# Patient Record
Sex: MALE | Race: WHITE | URBAN - METROPOLITAN AREA
[De-identification: names, ages, dates, MRNs, and addresses within clinical notes are randomized per-mention and may not be internally consistent; named-entity substitution may affect disease eponyms.]

---

## 2017-01-30 ENCOUNTER — TELEPHONE (OUTPATIENT)
Dept: FAMILY MEDICINE CLINIC | Facility: CLINIC | Age: 22
End: 2017-01-30

## 2017-01-30 PROBLEM — Z00.00 PHYSICAL EXAM: Status: ACTIVE | Noted: 2017-01-06

## 2017-01-30 PROBLEM — Z23 NEED FOR PROPHYLACTIC VACCINATION AND INOCULATION AGAINST VIRAL HEPATITIS: Status: ACTIVE | Noted: 2017-01-10

## 2017-01-30 PROBLEM — Z23 NEED FOR PROPHYLACTIC VACCINATION AND INOCULATION AGAINST SINGLE DISEASE: Status: ACTIVE | Noted: 2017-01-10

## 2017-01-30 RX ORDER — FLUTICASONE PROPIONATE 50 MCG
2 SPRAY, SUSPENSION (ML) NASAL AS NEEDED
COMMUNITY
Start: 2017-01-06 | End: 2018-04-04

## 2017-01-30 NOTE — TELEPHONE ENCOUNTER
Since his first [address] inj was in January, he is really due for it in March. Order entered. He will also be due for Gardasil #3 and hepatitis a #2 in 6 months, which would be July 2017.

## 2017-03-13 ENCOUNTER — NURSE ONLY (OUTPATIENT)
Dept: FAMILY MEDICINE CLINIC | Facility: CLINIC | Age: 22
End: 2017-03-13

## 2017-03-13 PROCEDURE — 90651 9VHPV VACCINE 2/3 DOSE IM: CPT | Performed by: NURSE PRACTITIONER

## 2017-03-13 PROCEDURE — 90471 IMMUNIZATION ADMIN: CPT | Performed by: NURSE PRACTITIONER

## 2017-03-15 ENCOUNTER — OFFICE VISIT (OUTPATIENT)
Dept: FAMILY MEDICINE CLINIC | Facility: CLINIC | Age: 22
End: 2017-03-15

## 2017-03-15 VITALS
DIASTOLIC BLOOD PRESSURE: 74 MMHG | HEART RATE: 84 BPM | SYSTOLIC BLOOD PRESSURE: 124 MMHG | TEMPERATURE: 98 F | OXYGEN SATURATION: 100 % | WEIGHT: 205.63 LBS | RESPIRATION RATE: 20 BRPM

## 2017-03-15 DIAGNOSIS — J40 BRONCHITIS: Primary | ICD-10-CM

## 2017-03-15 DIAGNOSIS — R09.82 POSTNASAL DRIP: ICD-10-CM

## 2017-03-15 PROBLEM — Z23 NEED FOR PROPHYLACTIC VACCINATION AND INOCULATION AGAINST SINGLE DISEASE: Status: RESOLVED | Noted: 2017-01-10 | Resolved: 2017-03-15

## 2017-03-15 PROBLEM — Z23 NEED FOR PROPHYLACTIC VACCINATION AND INOCULATION AGAINST VIRAL HEPATITIS: Status: RESOLVED | Noted: 2017-01-10 | Resolved: 2017-03-15

## 2017-03-15 PROCEDURE — 99214 OFFICE O/P EST MOD 30 MIN: CPT | Performed by: FAMILY MEDICINE

## 2017-03-15 RX ORDER — AZITHROMYCIN 250 MG/1
TABLET, FILM COATED ORAL
Qty: 6 TABLET | Refills: 0 | Status: SHIPPED | OUTPATIENT
Start: 2017-03-15 | End: 2017-04-04 | Stop reason: ALTCHOICE

## 2017-03-15 NOTE — PATIENT INSTRUCTIONS
Advice rest, plenty of fluids. Start antibiotics, continue with use nasal inhaler. May use mucinex or robitussin as needed   Return to clinic in 1-2 weeks if no improvement. Sooner if symptoms gets worse.

## 2017-03-16 NOTE — PROGRESS NOTES
[address]  PROGRESS NOTE  Chief Complaint:   Patient presents with:  URI: nasal congestion, cough, sputum production, past 2-3 weeks.        HPI:   This is a 24year old male presents complaining of cough, nasal congestion for past 2-3 w stiffness. LYMPHATICS:  Denies enlarged nodes  ENDOCRINOLOGIC:  Denies excessive sweating, cold or heat intolerance, polyuria or polydipsia. ALLERGIES:  Denies allergic response, history of asthma, sneezing, hives, eczema or rhinitis.      EXAM:   / or robitussin as needed   Return to clinic in 1-2 weeks if no improvement. Sooner if symptoms gets worse.         Health Maintenance:  Annual Depression Screen due on 09/18/1995  Hepatitis B Vaccines(1 of 3 - Primary Series) due on 09/18/1995  Hepatitis A V

## 2017-04-03 ENCOUNTER — TELEPHONE (OUTPATIENT)
Dept: FAMILY MEDICINE CLINIC | Facility: CLINIC | Age: 22
End: 2017-04-03

## 2017-04-03 NOTE — TELEPHONE ENCOUNTER
Called patient regarding symptoms. Patient states he had sudden left side pain, patient was getting out of bed and states he must have moved the wrong way and he felt chest pain, then went away right away.  Has happened before, pain is not severe but annoyi

## 2017-04-04 ENCOUNTER — OFFICE VISIT (OUTPATIENT)
Dept: FAMILY MEDICINE CLINIC | Facility: CLINIC | Age: 22
End: 2017-04-04

## 2017-04-04 VITALS
SYSTOLIC BLOOD PRESSURE: 146 MMHG | HEIGHT: 73 IN | RESPIRATION RATE: 16 BRPM | HEART RATE: 90 BPM | WEIGHT: 203.25 LBS | TEMPERATURE: 100 F | BODY MASS INDEX: 26.94 KG/M2 | DIASTOLIC BLOOD PRESSURE: 72 MMHG

## 2017-04-04 DIAGNOSIS — F41.9 ANXIETY: ICD-10-CM

## 2017-04-04 DIAGNOSIS — R07.89 CHEST WALL DISCOMFORT: Primary | ICD-10-CM

## 2017-04-04 PROCEDURE — 99213 OFFICE O/P EST LOW 20 MIN: CPT | Performed by: FAMILY MEDICINE

## 2017-04-04 NOTE — PROGRESS NOTES
[facility] [place]  PROGRESS NOTE        HPI:   This is a 24year old male coming in for Patient presents with:  Chest Pain: Chest pain on left side when taking a deep breath, after that his upper arms felt like they were flexed when relaxed.  No Review of Systems   Constitutional: Negative. Respiratory: Negative. Cardiovascular: Positive for chest pain. Gastrointestinal: Negative. Skin: Negative. Neurological: Negative.         EXAM:   /72 mmHg  Pulse 90  Temp(Src) 100 °F (37.8 Outcome: Parent verbalizes understanding. Parent is notified to call with any questions, complications, allergies, or worsening or changing symptoms. Parent is to call with any side effects or complications from the treatments as a result of today.

## 2017-08-02 ENCOUNTER — PATIENT MESSAGE (OUTPATIENT)
Dept: FAMILY MEDICINE CLINIC | Facility: CLINIC | Age: 22
End: 2017-08-02

## 2017-08-03 NOTE — TELEPHONE ENCOUNTER
Immunizations are up to date including Hep B, not all records have transferred over to the new system. You should be fully protected and would not need to be retested other than for peace of mind.  You are not due for any immunizations till age 22 for tetn

## 2017-08-05 ENCOUNTER — TELEPHONE (OUTPATIENT)
Dept: FAMILY MEDICINE CLINIC | Facility: CLINIC | Age: 22
End: 2017-08-05

## 2017-08-05 ENCOUNTER — NURSE ONLY (OUTPATIENT)
Dept: FAMILY MEDICINE CLINIC | Facility: CLINIC | Age: 22
End: 2017-08-05

## 2017-08-05 DIAGNOSIS — Z00.00 HEALTHCARE MAINTENANCE: ICD-10-CM

## 2017-08-05 DIAGNOSIS — Z23 NEED FOR VACCINATION: Primary | ICD-10-CM

## 2017-08-05 PROCEDURE — 90651 9VHPV VACCINE 2/3 DOSE IM: CPT | Performed by: FAMILY MEDICINE

## 2017-08-05 PROCEDURE — 90471 IMMUNIZATION ADMIN: CPT | Performed by: FAMILY MEDICINE

## 2017-08-05 NOTE — TELEPHONE ENCOUNTER
Pt states he is supposed to be coming in today for his 22nd HPV- first one was 1/10/17. No orders in the system. Can you please put an order in for HPV?     Yesi Baum, 08/05/17, 8:11 AM

## 2017-08-05 NOTE — PROGRESS NOTES
Gardasil 9 0.5ml given IM in the right deltoid. Pt tolerated injection. Was sent home with out incident.     Vanessa Holley, 08/05/17, 9:48 AM

## 2017-08-10 ENCOUNTER — TELEPHONE (OUTPATIENT)
Dept: FAMILY MEDICINE CLINIC | Facility: CLINIC | Age: 22
End: 2017-08-10

## 2017-08-10 NOTE — TELEPHONE ENCOUNTER
Pt is coming in 8/11 for Nurse Visit for 2nd Hep A- can you please put the orders in?     Yesi Baum, 08/10/17, 4:59 PM

## 2017-08-11 ENCOUNTER — NURSE ONLY (OUTPATIENT)
Dept: FAMILY MEDICINE CLINIC | Facility: CLINIC | Age: 22
End: 2017-08-11

## 2017-08-11 PROCEDURE — 90471 IMMUNIZATION ADMIN: CPT | Performed by: FAMILY MEDICINE

## 2017-08-11 PROCEDURE — 90632 HEPA VACCINE ADULT IM: CPT | Performed by: FAMILY MEDICINE

## 2018-01-09 ENCOUNTER — TELEPHONE (OUTPATIENT)
Dept: FAMILY MEDICINE CLINIC | Facility: CLINIC | Age: 23
End: 2018-01-09

## 2018-01-10 ENCOUNTER — IMMUNIZATION (OUTPATIENT)
Dept: FAMILY MEDICINE CLINIC | Facility: CLINIC | Age: 23
End: 2018-01-10

## 2018-01-10 DIAGNOSIS — Z23 NEED FOR VACCINATION: ICD-10-CM

## 2018-01-10 PROCEDURE — 90686 IIV4 VACC NO PRSV 0.5 ML IM: CPT | Performed by: FAMILY MEDICINE

## 2018-01-10 PROCEDURE — 90471 IMMUNIZATION ADMIN: CPT | Performed by: FAMILY MEDICINE

## 2018-03-15 ENCOUNTER — TELEPHONE (OUTPATIENT)
Dept: FAMILY MEDICINE CLINIC | Facility: CLINIC | Age: 23
End: 2018-03-15

## 2018-04-04 ENCOUNTER — OFFICE VISIT (OUTPATIENT)
Dept: FAMILY MEDICINE CLINIC | Facility: CLINIC | Age: 23
End: 2018-04-04

## 2018-04-04 VITALS
HEIGHT: 73 IN | WEIGHT: 199.19 LBS | RESPIRATION RATE: 16 BRPM | BODY MASS INDEX: 26.4 KG/M2 | DIASTOLIC BLOOD PRESSURE: 82 MMHG | HEART RATE: 80 BPM | SYSTOLIC BLOOD PRESSURE: 132 MMHG | TEMPERATURE: 98 F

## 2018-04-04 DIAGNOSIS — L72.3 SEBACEOUS CYST: Primary | ICD-10-CM

## 2018-04-04 PROCEDURE — 99213 OFFICE O/P EST LOW 20 MIN: CPT | Performed by: FAMILY MEDICINE

## 2018-04-04 NOTE — PATIENT INSTRUCTIONS
Likely benign cyst.   Recommend to monitor. If any change in size, redness, pain or infection then recommend to return to clinic or go see ENT Dr [place] Soulier.          Epidermoid Cyst (Sebaceous Cyst), No Infection  An epidermoid cyst (sebaceous cyst) is a te may not be painful  · The cyst may or may not smell bad  · The cyst may become inflamed or red  · The cyst may leak fluid or thick material  Home care  Epidermoid cysts often go away without any treatment.  If your cyst doesn’t go away, and it bothers you,

## 2018-04-04 NOTE — PROGRESS NOTES
[facility] [place]  PROGRESS NOTE  Chief Complaint:   Patient presents with:  Lump: lump under ear by jaw, couple weeks, no pain      HPI:   This is a 25year old male presents for evaluation of 2 small lumps on left side of her neck and the lef PSYCHIATRIC:  Denies depression or anxiety.       EXAM:   /82 (BP Location: Right arm, Patient Position: Sitting, Cuff Size: large)   Pulse 80   Temp 98.4 °F (36.9 °C) (Temporal)   Resp 16   Ht 73\"   Wt 199 lb 3.2 oz   BMI 26.28 kg/m²  Estimated body An epidermoid cyst (sebaceous cyst) is a term that refers to 2 similar types of cysts: those found in the skin (epidermoid), and those found around hair follicles (pilar).   Some general facts about these cysts:  · A cyst is a sac filled with material that Epidermoid cysts often go away without any treatment. If your cyst doesn’t go away, and it bothers you, it may be drained or removed. If the cyst drains on its own, it may return. Resist the temptation to squeeze, pop, stick a needle in it, or cut it open.

## 2020-02-13 ENCOUNTER — OFFICE VISIT (OUTPATIENT)
Dept: FAMILY MEDICINE CLINIC | Facility: CLINIC | Age: 25
End: 2020-02-13
Payer: COMMERCIAL

## 2020-02-13 VITALS
SYSTOLIC BLOOD PRESSURE: 120 MMHG | OXYGEN SATURATION: 98 % | WEIGHT: 219 LBS | RESPIRATION RATE: 16 BRPM | TEMPERATURE: 99 F | BODY MASS INDEX: 29.03 KG/M2 | DIASTOLIC BLOOD PRESSURE: 80 MMHG | HEART RATE: 89 BPM | HEIGHT: 73 IN

## 2020-02-13 DIAGNOSIS — Z23 NEED FOR VACCINATION: ICD-10-CM

## 2020-02-13 DIAGNOSIS — Z00.00 PHYSICAL EXAM: Primary | ICD-10-CM

## 2020-02-13 PROCEDURE — 90471 IMMUNIZATION ADMIN: CPT | Performed by: FAMILY MEDICINE

## 2020-02-13 PROCEDURE — 99395 PREV VISIT EST AGE 18-39: CPT | Performed by: FAMILY MEDICINE

## 2020-02-13 PROCEDURE — 90715 TDAP VACCINE 7 YRS/> IM: CPT | Performed by: FAMILY MEDICINE

## 2020-02-13 NOTE — PATIENT INSTRUCTIONS
Normal exam today. 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 Form filled out and signed. Tdap given today.

## 2020-02-13 NOTE — PROGRESS NOTES
[address]    Chief Complaint:   Patient presents with:  Physical      HPI:   Cristy Monahan is a 25year old male who presents for an Annual Health Visit.    Patient presents to clinic for physical exam and clearance to participate in denies unexpected wt gain or wt loss      EXAM:   /80 (BP Location: Right arm, Patient Position: Sitting, Cuff Size: large)   Pulse 89   Temp 98.9 °F (37.2 °C) (Tympanic)   Resp 16   Ht 73\"   Wt 219 lb (99.3 kg)   SpO2 98%   BMI 28.89 kg/m²    Wt Re agrees to the plan.     Problem List:  Patient Active Problem List:     Physical exam     Cervicalgia     Screening examination for sexually transmitted disease      Darryl Álvarez MD  2/13/2020  3:24 PM    This note was created utilizing Dragon speech recog

## (undated) NOTE — MR AVS SNAPSHOT
[address]  [place] [address] [id number]  [phone]               Thank you for choosing us for your health care visit with Anthony Garner MD.  We are glad to serve you and happy to provide you with this summary of yo Please consider the following Lifestyle Modifications. Also, please return for a follow-up Blood Pressure Check in 1 month.      Lifestyle Modification Recommendations:    Modification Recommendation   Weight Reduction Maintain normal body weight (body mas

## (undated) NOTE — MR AVS SNAPSHOT
[address] [address] [place] [address],  [address]  [phone]               Thank you for choosing us for your health care visit with Anthony Reeves MD.  We are glad to serve you and happy to provide you with this summary of yo information, go to [email or web address] and click on the Sign Up Now link in the [employer] box. Enter your [employer] Activation Code exactly as it appears below along with your Zip Code and Date of Birth to complete the sign-up process.  If you do You don’t need to join a gym. Home exercises work great.  Put more priority on exercise in your life                    Visit [facility] online at  [email or web address]